# Patient Record
Sex: FEMALE | Race: WHITE | NOT HISPANIC OR LATINO
[De-identification: names, ages, dates, MRNs, and addresses within clinical notes are randomized per-mention and may not be internally consistent; named-entity substitution may affect disease eponyms.]

---

## 2021-07-06 PROBLEM — Z00.00 ENCOUNTER FOR PREVENTIVE HEALTH EXAMINATION: Status: ACTIVE | Noted: 2021-07-06

## 2021-07-08 ENCOUNTER — APPOINTMENT (OUTPATIENT)
Dept: PULMONOLOGY | Facility: CLINIC | Age: 81
End: 2021-07-08

## 2021-08-11 ENCOUNTER — APPOINTMENT (OUTPATIENT)
Dept: PULMONOLOGY | Facility: CLINIC | Age: 81
End: 2021-08-11
Payer: MEDICARE

## 2021-08-11 VITALS
HEART RATE: 92 BPM | TEMPERATURE: 98 F | BODY MASS INDEX: 26.66 KG/M2 | SYSTOLIC BLOOD PRESSURE: 164 MMHG | DIASTOLIC BLOOD PRESSURE: 79 MMHG | HEIGHT: 65 IN | WEIGHT: 160 LBS | OXYGEN SATURATION: 98 %

## 2021-08-11 DIAGNOSIS — J44.9 CHRONIC OBSTRUCTIVE PULMONARY DISEASE, UNSPECIFIED: ICD-10-CM

## 2021-08-11 DIAGNOSIS — I27.20 PULMONARY HYPERTENSION, UNSPECIFIED: ICD-10-CM

## 2021-08-11 PROCEDURE — 36415 COLL VENOUS BLD VENIPUNCTURE: CPT

## 2021-08-11 PROCEDURE — 99204 OFFICE O/P NEW MOD 45 MIN: CPT | Mod: 25

## 2021-08-11 NOTE — DISCUSSION/SUMMARY
[FreeTextEntry1] : \par __________________________________\par PATIENT SUMMARY.\par ____________________________________\par      \par INITIAL VISIT 8/11/2021.            \par 78 f referred by Dr Becki Aguilar for COPD pulmonary hypertension\par Had asthma as child went away at Pub[facility]\par jaime came back in her 40s  \par At age 47 was intubated for 3 d at [facility] \par SYMPTOMS [facility] 1\par EXACERBATIONS No hosp in last 2 yr \par Is doing good with flovent and albute\par uses albuterol 1 d every 2 weeks \par Uses flovent everyday                    \par CXR 6/11/2021 N \par \par ____________\par PATIENT DATA\par ___________________\par \par AGE.    81 (8/11/2021)                   \par SEX.       f         \par ALLERGY.     nkda              \par SMOKING HISTORY. 8/11/2021 never smoked Was exposed to other people smoke several decades ago \par OCCUPATION. retd college admissions \par PETS. no \par FAM HO CANCER. no \par FAMILY HO VTE. no \par FAM HO ASTHMA. Fathers uncle had asthma children have asthma \par BIRTHPLACE.       [place]\par ASBESTOS EXPOSURE.no \par TB EXPOSURE.   No \par \par ____________\par PATIENT DATA\par ___________________\par DYSPNEA SCORE. 8/11/2021 Humidity bothers \par 8/11/2021 mmrc 1 sob on strenuous activity\par PEAK FLOW. \par PULSE OXIMETRY. 8/11/2021 ra 98%\par HOME OXYGEN.  8/11/2021 No \par HOME NIV.  8/11/2021 No \par HOME NEBUL.    8/11/2021 yes uses in fall and spring when pollen is hih\par BP. 8/11/2021 160/70 \par WEIGHT.    8/11/2021 160  \par BMI. 8/11/2021 26 \par EDS. \par STOP BANG SCORE.  \par ____________\par PATIENT DATA\par ___________________\par \par FLU VACCINE.   Fluzone 9/9/2020\par PNEUMONIA VACCINE.pcv 8/18/2014 \par pcv 12/10/2009 \par ppsv 12/10/2009 \par COVID VACCINE. pfizer 3/8/2021 2/15/2021 \par COVID STATUS. \par  \par CONTACT.    [phone] \par INITIAL VISIT. 8/11/2021\par PCP. DR BECKI AGUILAR\par \par ___________ \par MEDS. \par ___________\par \par 8/11/2021 albuterol \par amlodipine 10\par atenolol 50 \par flovent 110 \par losartan hctz 50 12  \par lovatst 40 \par spacer \par \par ___________\par PROBLEM LIST\par ______________\par     \par ALLERGIC RHINITIS \par GERD \par COPD \par PULM HYTN \par CHF DD2 \par UMBILICAL HERNIA \par CKD \par \par \par

## 2021-08-11 NOTE — ASSESSMENT
[FreeTextEntry1] : \par ___________________\par GENERAL ASSESSMENT/RECOMMEND (A/R)\par _____________________\par \par NEED FOR HOME OXYGEN.   Does not need\par OBESITY. Not overweight \par IMMUNIZATION. Upto date with flu pneumonia and covid \par LUNG CANCER SCREENING ELIGIBILITY. \par SMOKING STATUS. Not smoking \par SMOKING INTERVENTION. \par EDS. \par SLEEP APNEA SCREEN.\par SLEEP APNEA. \par _______________\par LAST FIRST ASSESSMENT RECOMMENDATIONS \par _________________\par \par \par PULMONARY HYPERTENSION\par 8/11/2021 Pt will bring echo reports \par 8/11/2021 check V duplex\par \par COPD \par 8/11/2021 Seems to be GOLD A or B if COPD and seems to be intermittent asthma \par 8/11/2021 Check labs \par 8/11/2021 Check PFTS \par 8/11/2021 COVID pcr \par 8/11/2021 RTC 3 w\par \par \par \par \par \par

## 2021-08-11 NOTE — CONSULT LETTER
[Dear  ___] : Dear  [unfilled], [Consult Letter:] : I had the pleasure of evaluating your patient, [unfilled]. [Please see my note below.] : Please see my note below. [Consult Closing:] : Thank you very much for allowing me to participate in the care of this patient.  If you have any questions, please do not hesitate to contact me. [FreeTextEntry3] : Yours truly,\par \par Clayton Real MD

## 2021-08-11 NOTE — REASON FOR VISIT
[Initial] : an initial visit [Asthma] : asthma [COPD] : COPD [Pulmonary Hypertension] : pulmonary hypertension [Spouse] : spouse [TextBox_13] : Dr. BECKI RAYA